# Patient Record
Sex: FEMALE | Race: WHITE | Employment: FULL TIME | RURAL
[De-identification: names, ages, dates, MRNs, and addresses within clinical notes are randomized per-mention and may not be internally consistent; named-entity substitution may affect disease eponyms.]

---

## 2017-03-15 ENCOUNTER — OFFICE VISIT (OUTPATIENT)
Dept: FAMILY MEDICINE CLINIC | Age: 52
End: 2017-03-15
Payer: COMMERCIAL

## 2017-03-15 VITALS
BODY MASS INDEX: 29.63 KG/M2 | HEART RATE: 87 BPM | OXYGEN SATURATION: 96 % | WEIGHT: 161 LBS | TEMPERATURE: 99.5 F | HEIGHT: 62 IN | DIASTOLIC BLOOD PRESSURE: 86 MMHG | SYSTOLIC BLOOD PRESSURE: 136 MMHG

## 2017-03-15 DIAGNOSIS — R50.9 FEVER, UNSPECIFIED FEVER CAUSE: Primary | ICD-10-CM

## 2017-03-15 DIAGNOSIS — I10 ESSENTIAL HYPERTENSION: ICD-10-CM

## 2017-03-15 DIAGNOSIS — E55.9 VITAMIN D DEFICIENCY: ICD-10-CM

## 2017-03-15 DIAGNOSIS — E11.9 TYPE 2 DIABETES MELLITUS WITHOUT COMPLICATION, WITHOUT LONG-TERM CURRENT USE OF INSULIN (HCC): ICD-10-CM

## 2017-03-15 DIAGNOSIS — E78.00 ELEVATED CHOLESTEROL: ICD-10-CM

## 2017-03-15 DIAGNOSIS — M54.2 NECK PAIN: ICD-10-CM

## 2017-03-15 DIAGNOSIS — F51.01 PRIMARY INSOMNIA: ICD-10-CM

## 2017-03-15 LAB
ALBUMIN SERPL-MCNC: 4.6 G/DL (ref 3.5–5.2)
ALP BLD-CCNC: 64 U/L (ref 35–104)
ALT SERPL-CCNC: 23 U/L (ref 5–33)
ANION GAP SERPL CALCULATED.3IONS-SCNC: 13 MMOL/L (ref 7–19)
AST SERPL-CCNC: 33 U/L (ref 5–32)
BILIRUB SERPL-MCNC: <0.2 MG/DL (ref 0.2–1.2)
BUN BLDV-MCNC: 11 MG/DL (ref 6–20)
CALCIUM SERPL-MCNC: 9.3 MG/DL (ref 8.6–10)
CHLORIDE BLD-SCNC: 102 MMOL/L (ref 98–111)
CHOLESTEROL, TOTAL: 163 MG/DL (ref 160–199)
CO2: 26 MMOL/L (ref 22–29)
CREAT SERPL-MCNC: 0.7 MG/DL (ref 0.5–0.9)
GFR NON-AFRICAN AMERICAN: >60
GLOBULIN: 2.9 G/DL
GLUCOSE BLD-MCNC: 98 MG/DL (ref 74–109)
HBA1C MFR BLD: 5.6 %
HCT VFR BLD CALC: 46.2 % (ref 37–47)
HDLC SERPL-MCNC: 46 MG/DL (ref 65–121)
HEMOGLOBIN: 14.4 G/DL (ref 12–16)
LDL CHOLESTEROL CALCULATED: 101 MG/DL
MCH RBC QN AUTO: 30.2 PG (ref 27–31)
MCHC RBC AUTO-ENTMCNC: 31.2 G/DL (ref 33–37)
MCV RBC AUTO: 96.9 FL (ref 81–99)
PDW BLD-RTO: 14.6 % (ref 11.5–14.5)
PLATELET # BLD: 179 K/UL (ref 130–400)
PMV BLD AUTO: 11.1 FL (ref 7.4–10.4)
POTASSIUM SERPL-SCNC: 5.2 MMOL/L (ref 3.5–5)
RBC # BLD: 4.77 M/UL (ref 4.2–5.4)
S PYO AG THROAT QL: NORMAL
SODIUM BLD-SCNC: 141 MMOL/L (ref 136–145)
TOTAL PROTEIN: 7.5 G/DL (ref 6.6–8.7)
TRIGL SERPL-MCNC: 80 MG/DL (ref 150–199)
VITAMIN D 25-HYDROXY: 30.7 NG/ML
WBC # BLD: 2.9 K/UL (ref 4.8–10.8)

## 2017-03-15 PROCEDURE — 87880 STREP A ASSAY W/OPTIC: CPT | Performed by: NURSE PRACTITIONER

## 2017-03-15 PROCEDURE — 36415 COLL VENOUS BLD VENIPUNCTURE: CPT | Performed by: NURSE PRACTITIONER

## 2017-03-15 PROCEDURE — 99214 OFFICE O/P EST MOD 30 MIN: CPT | Performed by: NURSE PRACTITIONER

## 2017-03-15 RX ORDER — AMOXICILLIN AND CLAVULANATE POTASSIUM 875; 125 MG/1; MG/1
1 TABLET, FILM COATED ORAL 2 TIMES DAILY
Qty: 20 TABLET | Refills: 0 | Status: SHIPPED | OUTPATIENT
Start: 2017-03-15 | End: 2017-03-21

## 2017-03-15 RX ORDER — BUTALBITAL, ACETAMINOPHEN AND CAFFEINE 50; 325; 40 MG/1; MG/1; MG/1
1 TABLET ORAL EVERY 4 HOURS PRN
Qty: 60 TABLET | Refills: 2 | Status: SHIPPED | OUTPATIENT
Start: 2017-03-15 | End: 2020-06-04 | Stop reason: ALTCHOICE

## 2017-03-15 RX ORDER — METHYLPREDNISOLONE 4 MG/1
TABLET ORAL
Qty: 1 KIT | Refills: 0 | Status: SHIPPED | OUTPATIENT
Start: 2017-03-15 | End: 2017-03-21

## 2017-03-15 RX ORDER — BUTALBITAL, ACETAMINOPHEN AND CAFFEINE 50; 325; 40 MG/1; MG/1; MG/1
1 TABLET ORAL EVERY 4 HOURS PRN
Qty: 60 TABLET | Refills: 0 | Status: CANCELLED | OUTPATIENT
Start: 2017-03-15

## 2017-03-15 ASSESSMENT — ENCOUNTER SYMPTOMS
ORTHOPNEA: 0
SPUTUM PRODUCTION: 1
WHEEZING: 0
COUGH: 1
RHINORRHEA: 1
GASTROINTESTINAL NEGATIVE: 1
BACK PAIN: 0
SORE THROAT: 1
HEMOPTYSIS: 0
EYES NEGATIVE: 1

## 2017-03-21 ENCOUNTER — OFFICE VISIT (OUTPATIENT)
Dept: FAMILY MEDICINE CLINIC | Age: 52
End: 2017-03-21
Payer: COMMERCIAL

## 2017-03-21 VITALS
BODY MASS INDEX: 29.63 KG/M2 | DIASTOLIC BLOOD PRESSURE: 88 MMHG | WEIGHT: 161 LBS | SYSTOLIC BLOOD PRESSURE: 130 MMHG | HEART RATE: 70 BPM | HEIGHT: 62 IN | OXYGEN SATURATION: 96 % | TEMPERATURE: 98.6 F

## 2017-03-21 DIAGNOSIS — R06.02 SOB (SHORTNESS OF BREATH): ICD-10-CM

## 2017-03-21 DIAGNOSIS — R05.9 COUGH: Primary | ICD-10-CM

## 2017-03-21 DIAGNOSIS — J40 BRONCHITIS: ICD-10-CM

## 2017-03-21 PROCEDURE — 71020 XR CHEST STANDARD TWO VW: CPT | Performed by: NURSE PRACTITIONER

## 2017-03-21 PROCEDURE — 99214 OFFICE O/P EST MOD 30 MIN: CPT | Performed by: NURSE PRACTITIONER

## 2017-03-21 RX ORDER — IPRATROPIUM BROMIDE AND ALBUTEROL SULFATE 2.5; .5 MG/3ML; MG/3ML
SOLUTION RESPIRATORY (INHALATION)
Qty: 360 ML | Refills: 5 | Status: SHIPPED | OUTPATIENT
Start: 2017-03-21

## 2017-03-21 RX ORDER — GUAIFENESIN AND CODEINE PHOSPHATE 100; 10 MG/5ML; MG/5ML
10 SOLUTION ORAL EVERY 4 HOURS PRN
Qty: 240 ML | Refills: 0 | Status: SHIPPED | OUTPATIENT
Start: 2017-03-21 | End: 2018-03-21

## 2017-03-21 RX ORDER — LEVOFLOXACIN 500 MG/1
500 TABLET, FILM COATED ORAL DAILY
Qty: 10 TABLET | Refills: 0 | Status: SHIPPED | OUTPATIENT
Start: 2017-03-21 | End: 2017-03-31

## 2017-03-28 ENCOUNTER — OFFICE VISIT (OUTPATIENT)
Dept: FAMILY MEDICINE CLINIC | Age: 52
End: 2017-03-28
Payer: COMMERCIAL

## 2017-03-28 VITALS
BODY MASS INDEX: 29.44 KG/M2 | WEIGHT: 160 LBS | HEIGHT: 62 IN | SYSTOLIC BLOOD PRESSURE: 132 MMHG | DIASTOLIC BLOOD PRESSURE: 86 MMHG
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
## 2017-03-28 DIAGNOSIS — M54.2 NECK PAIN: ICD-10-CM

## 2017-03-28 DIAGNOSIS — M79.642 BILATERAL HAND PAIN: ICD-10-CM

## 2017-03-28 DIAGNOSIS — M79.641 BILATERAL HAND PAIN: ICD-10-CM

## 2017-03-28 DIAGNOSIS — M54.9 OTHER ACUTE BACK PAIN: ICD-10-CM

## 2017-03-28 DIAGNOSIS — M79.602 PAIN OF LEFT UPPER EXTREMITY: ICD-10-CM

## 2017-03-28 PROCEDURE — 72110 X-RAY EXAM L-2 SPINE 4/>VWS: CPT | Performed by: NURSE PRACTITIONER

## 2017-03-28 PROCEDURE — 73080 X-RAY EXAM OF ELBOW: CPT | Performed by: NURSE PRACTITIONER

## 2017-03-28 PROCEDURE — 72050 X-RAY EXAM NECK SPINE 4/5VWS: CPT | Performed by: NURSE PRACTITIONER

## 2017-03-28 PROCEDURE — 73130 X-RAY EXAM OF HAND: CPT | Performed by: NURSE PRACTITIONER

## 2017-03-28 PROCEDURE — 99214 OFFICE O/P EST MOD 30 MIN: CPT | Performed by: NURSE PRACTITIONER

## 2017-03-28 PROCEDURE — 73502 X-RAY EXAM HIP UNI 2-3 VIEWS: CPT | Performed by: NURSE PRACTITIONER

## 2017-03-28 ASSESSMENT — ENCOUNTER SYMPTOMS
NAUSEA: 0
ORTHOPNEA: 0
EYES NEGATIVE: 1
SPUTUM PRODUCTION: 0
COUGH: 0
HEMOPTYSIS: 0
RESPIRATORY NEGATIVE: 1
BACK PAIN: 1
GASTROINTESTINAL NEGATIVE: 1

## 2017-03-29 ENCOUNTER — HOSPITAL ENCOUNTER (OUTPATIENT)
Dept: GENERAL RADIOLOGY | Age: 52
Discharge: HOME | End: 2017-03-29
Payer: COMMERCIAL
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
## 2017-03-29 DIAGNOSIS — M79.642 BILATERAL HAND PAIN: ICD-10-CM

## 2017-03-29 DIAGNOSIS — M79.602 PAIN OF LEFT UPPER EXTREMITY: ICD-10-CM

## 2017-03-29 DIAGNOSIS — M54.9 OTHER ACUTE BACK PAIN: ICD-10-CM

## 2017-03-29 DIAGNOSIS — M54.2 NECK PAIN: ICD-10-CM

## 2017-03-29 DIAGNOSIS — M79.641 BILATERAL HAND PAIN: ICD-10-CM

## 2017-04-05 ENCOUNTER — HOSPITAL ENCOUNTER (OUTPATIENT)
Dept: GENERAL RADIOLOGY | Age: 52
Discharge: HOME | End: 2017-04-05
Payer: COMMERCIAL

## 2017-04-05 DIAGNOSIS — R06.02 SOB (SHORTNESS OF BREATH): ICD-10-CM

## 2017-04-05 DIAGNOSIS — R05.9 COUGH: ICD-10-CM

## 2017-04-11 ENCOUNTER — TELEPHONE (OUTPATIENT)
Dept: FAMILY MEDICINE CLINIC | Age: 52
End: 2017-04-11

## 2017-04-11 RX ORDER — LEVOFLOXACIN 500 MG/1
500 TABLET, FILM COATED ORAL DAILY
Qty: 10 TABLET | Refills: 0 | Status: SHIPPED | OUTPATIENT
Start: 2017-04-11 | End: 2017-04-21

## 2017-06-08 ENCOUNTER — NURSE ONLY (OUTPATIENT)
Dept: FAMILY MEDICINE CLINIC | Age: 52
End: 2017-06-08
Payer: COMMERCIAL

## 2017-06-08 DIAGNOSIS — G63 NEUROPATHY DUE TO SJOGREN'S SYNDROME (HCC): Primary | ICD-10-CM

## 2017-06-08 DIAGNOSIS — M35.06 NEUROPATHY DUE TO SJOGREN'S SYNDROME (HCC): Primary | ICD-10-CM

## 2017-06-08 LAB
BASOPHILS ABSOLUTE: 0.1 K/UL (ref 0–0.2)
BASOPHILS RELATIVE PERCENT: 0.7 % (ref 0–1)
EOSINOPHILS ABSOLUTE: 0.2 K/UL (ref 0–0.6)
EOSINOPHILS RELATIVE PERCENT: 3.1 % (ref 0–5)
HCT VFR BLD CALC: 43.9 % (ref 37–47)
HEMOGLOBIN: 13.6 G/DL (ref 12–16)
LYMPHOCYTES ABSOLUTE: 2.5 K/UL (ref 1.1–4.5)
LYMPHOCYTES RELATIVE PERCENT: 35.4 % (ref 20–40)
MCH RBC QN AUTO: 30.3 PG (ref 27–31)
MCHC RBC AUTO-ENTMCNC: 31 G/DL (ref 33–37)
MCV RBC AUTO: 97.8 FL (ref 81–99)
MONOCYTES ABSOLUTE: 0.5 K/UL (ref 0–0.9)
MONOCYTES RELATIVE PERCENT: 7.7 % (ref 0–10)
NEUTROPHILS ABSOLUTE: 3.7 K/UL (ref 1.5–7.5)
NEUTROPHILS RELATIVE PERCENT: 52.8 % (ref 50–65)
PDW BLD-RTO: 13.5 % (ref 11.5–14.5)
PLATELET # BLD: 216 K/UL (ref 130–400)
PMV BLD AUTO: 10.9 FL (ref 9.4–12.3)
RBC # BLD: 4.49 M/UL (ref 4.2–5.4)
WBC # BLD: 7 K/UL (ref 4.8–10.8)

## 2017-06-08 PROCEDURE — 36415 COLL VENOUS BLD VENIPUNCTURE: CPT | Performed by: NURSE PRACTITIONER

## 2017-06-19 ENCOUNTER — TELEPHONE (OUTPATIENT)
Dept: FAMILY MEDICINE CLINIC | Age: 52
End: 2017-06-19

## 2017-06-19 RX ORDER — METHYLPREDNISOLONE 4 MG/1
TABLET ORAL
Qty: 1 KIT | Refills: 0 | Status: SHIPPED | OUTPATIENT
Start: 2017-06-19 | End: 2017-06-25

## 2017-06-19 RX ORDER — AMOXICILLIN AND CLAVULANATE POTASSIUM 875; 125 MG/1; MG/1
1 TABLET, FILM COATED ORAL 2 TIMES DAILY
Qty: 20 TABLET | Refills: 0 | Status: SHIPPED | OUTPATIENT
Start: 2017-06-19 | End: 2017-06-29

## 2017-07-24 ENCOUNTER — OFFICE VISIT (OUTPATIENT)
Dept: FAMILY MEDICINE CLINIC | Age: 52
End: 2017-07-24
Payer: COMMERCIAL

## 2017-07-24 VITALS
BODY MASS INDEX: 29.08 KG/M2 | DIASTOLIC BLOOD PRESSURE: 80 MMHG | HEIGHT: 62 IN | SYSTOLIC BLOOD PRESSURE: 112 MMHG | WEIGHT: 158 LBS

## 2017-07-24 DIAGNOSIS — R10.30 LOWER ABDOMINAL PAIN: ICD-10-CM

## 2017-07-24 DIAGNOSIS — K59.09 CHRONIC CONSTIPATION: ICD-10-CM

## 2017-07-24 DIAGNOSIS — R35.0 FREQUENCY OF URINATION: Primary | ICD-10-CM

## 2017-07-24 DIAGNOSIS — Z72.0 TOBACCO ABUSE: ICD-10-CM

## 2017-07-24 LAB
BILIRUBIN, POC: NORMAL
BLOOD URINE, POC: NORMAL
CLARITY, POC: CLEAR
COLOR, POC: YELLOW
GLUCOSE URINE, POC: NORMAL
KETONES, POC: NORMAL
LEUKOCYTE EST, POC: NORMAL
NITRITE, POC: NORMAL
PH, POC: 6
PROTEIN, POC: POSITIVE
SPECIFIC GRAVITY, POC: 1.03
UROBILINOGEN, POC: NORMAL

## 2017-07-24 PROCEDURE — 81002 URINALYSIS NONAUTO W/O SCOPE: CPT | Performed by: NURSE PRACTITIONER

## 2017-07-24 PROCEDURE — 99213 OFFICE O/P EST LOW 20 MIN: CPT | Performed by: NURSE PRACTITIONER

## 2017-07-24 RX ORDER — NICOTINE 21 MG/24HR
1 PATCH, TRANSDERMAL 24 HOURS TRANSDERMAL EVERY 24 HOURS
Qty: 30 PATCH | Refills: 3 | Status: SHIPPED | OUTPATIENT
Start: 2017-07-24 | End: 2020-06-04 | Stop reason: ALTCHOICE

## 2017-07-24 RX ORDER — PHENAZOPYRIDINE HYDROCHLORIDE 100 MG/1
100 TABLET, FILM COATED ORAL 3 TIMES DAILY PRN
Qty: 10 TABLET | Refills: 1 | Status: SHIPPED | OUTPATIENT
Start: 2017-07-24 | End: 2020-06-04

## 2017-07-24 RX ORDER — POLYETHYLENE GLYCOL 3350 17 G/17G
POWDER, FOR SOLUTION ORAL
Qty: 850 G | Refills: 5 | Status: SHIPPED | OUTPATIENT
Start: 2017-07-24 | End: 2020-06-04

## 2017-07-24 ASSESSMENT — PATIENT HEALTH QUESTIONNAIRE - PHQ9
4. FEELING TIRED OR HAVING LITTLE ENERGY: 3
5. POOR APPETITE OR OVEREATING: 0
8. MOVING OR SPEAKING SO SLOWLY THAT OTHER PEOPLE COULD HAVE NOTICED. OR THE OPPOSITE, BEING SO FIGETY OR RESTLESS THAT YOU HAVE BEEN MOVING AROUND A LOT MORE THAN USUAL: 3
10. IF YOU CHECKED OFF ANY PROBLEMS, HOW DIFFICULT HAVE THESE PROBLEMS MADE IT FOR YOU TO DO YOUR WORK, TAKE CARE OF THINGS AT HOME, OR GET ALONG WITH OTHER PEOPLE: 2
3. TROUBLE FALLING OR STAYING ASLEEP: 3
6. FEELING BAD ABOUT YOURSELF - OR THAT YOU ARE A FAILURE OR HAVE LET YOURSELF OR YOUR FAMILY DOWN: 0
9. THOUGHTS THAT YOU WOULD BE BETTER OFF DEAD, OR OF HURTING YOURSELF: 0
2. FEELING DOWN, DEPRESSED OR HOPELESS: 3
7. TROUBLE CONCENTRATING ON THINGS, SUCH AS READING THE NEWSPAPER OR WATCHING TELEVISION: 3
SUM OF ALL RESPONSES TO PHQ QUESTIONS 1-9: 18
1. LITTLE INTEREST OR PLEASURE IN DOING THINGS: 3
SUM OF ALL RESPONSES TO PHQ9 QUESTIONS 1 & 2: 6

## 2017-07-24 ASSESSMENT — ENCOUNTER SYMPTOMS
CONSTIPATION: 1
RESPIRATORY NEGATIVE: 1
EYES NEGATIVE: 1
HEMOPTYSIS: 0
BACK PAIN: 0
SPUTUM PRODUCTION: 0
ABDOMINAL PAIN: 1
ORTHOPNEA: 0
COUGH: 0
NAUSEA: 0

## 2017-07-26 ENCOUNTER — OFFICE VISIT (OUTPATIENT)
Dept: FAMILY MEDICINE CLINIC | Age: 52
End: 2017-07-26
Payer: COMMERCIAL

## 2017-07-26 VITALS
DIASTOLIC BLOOD PRESSURE: 80 MMHG | BODY MASS INDEX: 29.26 KG/M2 | HEIGHT: 62 IN | SYSTOLIC BLOOD PRESSURE: 130 MMHG | WEIGHT: 159 LBS

## 2017-07-26 DIAGNOSIS — R10.30 LOWER ABDOMINAL PAIN: Primary | ICD-10-CM

## 2017-07-26 DIAGNOSIS — N89.8 VAGINAL ODOR: ICD-10-CM

## 2017-07-26 LAB
BILIRUBIN, POC: NORMAL
BLOOD URINE, POC: NORMAL
CLARITY, POC: CLEAR
COLOR, POC: YELLOW
GLUCOSE URINE, POC: NORMAL
KETONES, POC: NORMAL
LEUKOCYTE EST, POC: NORMAL
NITRITE, POC: NORMAL
PH, POC: 8.5
PROTEIN, POC: NORMAL
SPECIFIC GRAVITY, POC: 1.01
UROBILINOGEN, POC: NORMAL

## 2017-07-26 PROCEDURE — 81002 URINALYSIS NONAUTO W/O SCOPE: CPT | Performed by: NURSE PRACTITIONER

## 2017-07-26 PROCEDURE — 99213 OFFICE O/P EST LOW 20 MIN: CPT | Performed by: NURSE PRACTITIONER

## 2017-07-26 RX ORDER — METRONIDAZOLE 500 MG/1
500 TABLET ORAL 2 TIMES DAILY
Qty: 14 TABLET | Refills: 0 | Status: SHIPPED | OUTPATIENT
Start: 2017-07-26 | End: 2017-08-02

## 2017-07-26 ASSESSMENT — ENCOUNTER SYMPTOMS
BACK PAIN: 0
CONSTIPATION: 1
RESPIRATORY NEGATIVE: 1
COUGH: 0
ORTHOPNEA: 0
ABDOMINAL PAIN: 1
SPUTUM PRODUCTION: 0
EYES NEGATIVE: 1
HEMOPTYSIS: 0

## 2017-08-02 ENCOUNTER — TELEPHONE (OUTPATIENT)
Dept: FAMILY MEDICINE CLINIC | Age: 52
End: 2017-08-02

## 2018-01-25 ENCOUNTER — APPOINTMENT (OUTPATIENT)
Dept: GENERAL RADIOLOGY | Facility: HOSPITAL | Age: 53
End: 2018-01-25

## 2018-01-25 PROCEDURE — 73030 X-RAY EXAM OF SHOULDER: CPT

## 2020-01-21 ENCOUNTER — PROCEDURE VISIT (OUTPATIENT)
Dept: OTOLARYNGOLOGY | Age: 55
End: 2020-01-21
Payer: MEDICAID

## 2020-01-21 ENCOUNTER — OFFICE VISIT (OUTPATIENT)
Dept: OTOLARYNGOLOGY | Age: 55
End: 2020-01-21
Payer: MEDICAID

## 2020-01-21 VITALS
DIASTOLIC BLOOD PRESSURE: 78 MMHG | HEIGHT: 62 IN | SYSTOLIC BLOOD PRESSURE: 124 MMHG | BODY MASS INDEX: 29.81 KG/M2 | WEIGHT: 162 LBS

## 2020-01-21 PROCEDURE — 99203 OFFICE O/P NEW LOW 30 MIN: CPT | Performed by: NURSE PRACTITIONER

## 2020-01-21 PROCEDURE — 92557 COMPREHENSIVE HEARING TEST: CPT | Performed by: AUDIOLOGIST

## 2020-01-21 PROCEDURE — 92550 TYMPANOMETRY & REFLEX THRESH: CPT | Performed by: AUDIOLOGIST

## 2020-01-21 RX ORDER — MECLIZINE HYDROCHLORIDE 25 MG/1
25 TABLET ORAL 2 TIMES DAILY PRN
Qty: 30 TABLET | Refills: 0 | Status: SHIPPED | OUTPATIENT
Start: 2020-01-21 | End: 2020-03-21

## 2020-01-21 RX ORDER — ENALAPRIL MALEATE 10 MG/1
10 TABLET ORAL DAILY
COMMUNITY

## 2020-01-21 ASSESSMENT — ENCOUNTER SYMPTOMS
ALLERGIC/IMMUNOLOGIC NEGATIVE: 1
EYES NEGATIVE: 1
GASTROINTESTINAL NEGATIVE: 1
RESPIRATORY NEGATIVE: 1
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
## 2020-01-29 ENCOUNTER — HOSPITAL ENCOUNTER (OUTPATIENT)
Dept: CT IMAGING | Age: 55
Discharge: HOME | End: 2020-01-29
Payer: MEDICAID

## 2020-01-29 PROCEDURE — 70470 CT HEAD/BRAIN W/O & W/DYE: CPT

## 2020-01-29 PROCEDURE — 6360000004 HC RX CONTRAST MEDICATION: Performed by: NURSE PRACTITIONER

## 2020-01-29 RX ADMIN — IOPAMIDOL 60 ML: 755 INJECTION, SOLUTION INTRAVENOUS at 11:13

## 2020-01-30 ENCOUNTER — TELEPHONE (OUTPATIENT)
Dept: NEUROLOGY | Age: 55
End: 2020-01-30

## 2020-04-28 ENCOUNTER — TELEPHONE (OUTPATIENT)
Dept: NEUROSURGERY | Age: 55
End: 2020-04-28

## 2020-06-01 ENCOUNTER — TELEPHONE (OUTPATIENT)
Dept: NEUROLOGY | Age: 55
End: 2020-06-01

## 2020-06-04 ENCOUNTER — OFFICE VISIT (OUTPATIENT)
Dept: NEUROLOGY | Age: 55
End: 2020-06-04
Payer: MEDICAID

## 2020-06-04 VITALS
DIASTOLIC BLOOD PRESSURE: 86 MMHG | BODY MASS INDEX: 29.81 KG/M2 | SYSTOLIC BLOOD PRESSURE: 160 MMHG | HEIGHT: 62 IN | WEIGHT: 162 LBS | HEART RATE: 68 BPM

## 2020-06-04 DIAGNOSIS — G89.29 CHRONIC INTRACTABLE HEADACHE, UNSPECIFIED HEADACHE TYPE: ICD-10-CM

## 2020-06-04 DIAGNOSIS — R51.9 CHRONIC INTRACTABLE HEADACHE, UNSPECIFIED HEADACHE TYPE: ICD-10-CM

## 2020-06-04 LAB — SEDIMENTATION RATE, ERYTHROCYTE: 8 MM/HR (ref 0–25)

## 2020-06-04 PROCEDURE — 99213 OFFICE O/P EST LOW 20 MIN: CPT | Performed by: PHYSICIAN ASSISTANT

## 2020-06-04 RX ORDER — GABAPENTIN 300 MG/1
1 CAPSULE ORAL 3 TIMES DAILY
COMMUNITY
Start: 2020-05-01

## 2020-06-04 RX ORDER — UBROGEPANT 100 MG/1
TABLET ORAL
Qty: 30 TABLET | Refills: 2 | Status: SHIPPED | OUTPATIENT
Start: 2020-06-04

## 2020-06-04 RX ORDER — NAPROXEN SODIUM 220 MG
220 TABLET ORAL 2 TIMES DAILY WITH MEALS
COMMUNITY
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
## 2020-06-04 NOTE — PATIENT INSTRUCTIONS
Take 1 as a single dose at onset of migraine, may repeat once based on response and tolerability after 2 hours, maximum of 200 mg in 24 hours and for no more than 8 migraines in a 30 day period        Patient education: Headache causes and diagnosis in adults     HEADACHE OVERVIEW -- Headaches can be quite debilitating, although most headaches are not caused by life-threatening disorders. Most headaches are caused by one of four syndromes:  ?Tension-type headache  ? Migraine headache  ? Chronic daily headache  ? Cluster headache   The causes and diagnosis of non-migraine headaches are discussed here. Migraine headaches are discussed separately. A summary of headache treatments is also available. A discussion of headaches in children is available separately. TENSION TYPE HEADACHE  Symptoms -- Symptoms of tension type headaches (TTH) include:  ?Pressure or tightness around both sides of the head or neck  ? Mild to moderate pain that is steady and does not throb  ? Pain is not worsened by activity  ? Pain can increase or decrease in severity over the course of the headache  ? There may be tenderness in the muscles of the head, neck, or shoulders  People with TTH often feel stress or tension before their headache. Unlike migraine, tension headaches occur without other symptoms such as nausea, vomiting, sensitivity to lights and sounds, or an aura. However, some people have symptoms of both tension and migraine headache. MIGRAINE HEADACHES -- Migraine headaches are a type of headache that causes moderate to severe pain that is worsened by light, noise, and motion. Some people also experience nausea and vomiting. Migraine headaches typically last for a few hours, but may last for as long as three days. Migraines are discussed in detail in a separate article. CLUSTER HEADACHE -- Cluster headaches are severe, debilitating headaches that occur repeatedly for weeks to months at a time, followed by periods with no headache. headaches\". Medication-overuse headache -- Medication-overuse headache [facility]) may occur in people who have frequent migraine, cluster, or tension-type headaches, which leads them to overuse pain medications. A vicious cycle occurs whereby frequent headaches cause the person to take medication frequently (often non-prescription medication), which then causes a rebound headache as the medication wears off, causing more medication use, and so on. Limit the use of acute symptomatic migraine medication (Maxalt, Imitrex, Relpax, Fioricet, opioids, and others) to <10x/month and simple/combination analgesics (Ibuprofen, Tylenol, Excedrin Migraine, and others)<5x/month. Overuse of any number of pain medications can increase the risk of developing medication-overuse headaches. To avoid medication-overuse headaches, we recommend the following:  ? If possible, avoid butalbital combinations (Fiorinal®, Fioricet®, Esgic®) and narcotics completely. ?Do not use triptans (Imitrex® and others) or aspirin/acetaminophen/caffeine combinations (Excedrin®) more than nine days per month. ?Do not use NSAIDS (eg, ibuprofen, Advil, Motrin, aspirin) more than 15 days per month. ?Do not take acetaminophen (eg, Tylenol®) more than two times per week. If you have frequent headaches, you may need a preventive medication. OTHER TYPES OF HEADACHE -- There are a number of other causes of headache. Sinus headache -- Recurrent headaches related to sinus infections are uncommon. Many, if not most, people diagnosed with sinus headaches actually have migraine headaches. Sinus-related pain usually lasts for several days (unlike a typical migraine) and does not cause nausea, vomiting, or sensitivity to noise or light (as seen in migraine)   Post-trauma headaches -- Headaches that occur within one to two days after a head injury are relatively common. Most people report a generalized dull, aching, constant discomfort that worsens intermittently. Other common symptoms include vertigo (sensation of spinning), lightheadedness, difficulty concentrating, problems with memory, becoming tired quickly, and irritability. Post-trauma headaches may continue for up to a few months, although anyone with a headache that does not begin to improve within a week or two after a traumatic event should be evaluated. HEADACHE DIAGNOSIS -- Clinicians typically use a person's description of their headache, in combination with an examination, to determine the type of headache. Some people have more than one type of headache. Most people do not need x-rays or imaging tests. A CT scan (or MRI) may be recommended in some circumstances, for example, if symptoms are unusual, if there are any danger signs (see 'Headache danger signs' below), or if there are any abnormalities seen during the examination. Other possible reasons for brain imaging include:  ?Headaches that steadily worsen despite treatment  ? A sudden change in the pattern of headaches  ? Signs or symptoms that suggest that another medical condition may be causing symptoms  HEADACHE DANGER SIGNS -- The vast majority of headaches are not life threatening. You should seek medical attention immediately if your headache:  ?Comes on suddenly, becomes severe within a few seconds or minutes, or that could be described as \"the worst headache of your life\"  ? Is severe and occurs with a fever or stiff neck  ? Occurs with a seizure, personality changes, confusion, or passing out  ? Begins quickly after strenuous exercise or minor injury  ? Is new and occurs with weakness, numbness, or difficulty seeing. While migraine headaches can sometimes cause these symptoms, you should be evaluated urgently the first time these symptoms appear.   If you have persistent or frequent headaches, headaches that interfere with normal activities, or your headaches become more painful, you should see a healthcare provider during normal office effects. COMPLEMENTARY HEADACHE TREATMENT -- Several therapies can be used along with medical treatment in people with headache. Lifestyle changes -- Some simple lifestyle adjustments can help to reduce the frequency of headaches. These include:  ? Stop smoking  ? Reduce the amount of alcohol you drink  ? Decrease or stop drinking/eating caffeine  ? Eat and sleep on a regular schedule  ? Exercise several times per week  While there are no clinical trials proving the benefit of these measures, many headache specialists have found them helpful for their patients. Physical therapy -- Some people with frequent headaches benefit from working with a physical therapist who has a special interest in headaches. This treatment may be used if you do not respond or only partially or temporarily respond to medicines, or if you cannot use medicines (eg, women who are pregnant or breastfeeding). Acupuncture -- Acupuncture involves inserting hair-thin, metal needles into the skin at specific points on the body. It causes little to no pain. Electrical stimulation is sometimes applied to the acupuncture needle. Acupuncture has not been proven to improve tension-type or chronic daily headaches. However, people who do not want to try or who cannot tolerate other treatments may try using acupuncture. Behavioral therapy -- Headaches can be triggered or worsened by stress, anxiety, depression, and other psychological factors. Furthermore, living with headache pain can cause difficulties in relationships, at work or school, and with general day to day living. Behavioral therapy works by helping you to address the stress, anger, or frustration that can come with frequent or chronic headache pain. There are many different types of behavioral therapy:  ? Psychotherapy involves meeting with a psychologist, psychiatrist, or  to discuss emotional responses to living with chronic pain, treatment successes or failures, and/or personal relationships. ?Group psychotherapy allows you to compare your experiences with headaches, overcome the tendency to withdraw and become isolated in your pain, and support others' attempts with pain management. ?Relaxation techniques can relieve muscle tension, and may include meditation, progressive muscle relaxation, self-hypnosis, and biofeedback. Biofeedback may be especially helpful for people with chronic tension-type headaches. ?Group skill-building exercises help you to learn about living with pain, including ways to improve relationships and build strength, ways to avoid negative thinking, and learning to deal with pain flares. A number of self-help books are also available on these topics, including Brynn Bills and Karine's The Pain Survival Guide: How to [address], and Ed's Managing Pain Before It Manages You   Herbal and homeopathic remedies -- A number of homeopathic remedies are promoted to relieve or prevent headaches, including migraines. However, the effect of these remedies is not clear and these remedies are not recommended. WHERE TO GET MORE INFORMATION -- Your healthcare provider is the best source of information for questions and concerns related to your medical problem. This article will be updated as needed on our web site ([email or web address]). Related topics for patients, as well as selected articles written for healthcare professionals, are also available. Some of the most relevant are listed below.

## 2020-06-05 ENCOUNTER — TELEPHONE (OUTPATIENT)
Dept: NEUROLOGY | Age: 55
End: 2020-06-05

## 2020-06-05 PROBLEM — R51.9 CHRONIC INTRACTABLE HEADACHE: Status: ACTIVE | Noted: 2020-06-05

## 2020-06-05 PROBLEM — G89.29 CHRONIC INTRACTABLE HEADACHE: Status: ACTIVE | Noted: 2020-06-05

## 2020-06-08 ENCOUNTER — TELEPHONE (OUTPATIENT)
Dept: NEUROLOGY | Age: 55
End: 2020-06-08

## 2020-06-08 NOTE — TELEPHONE ENCOUNTER
Dorian Cook (Key: [id number])   Rx #: [id number]   Ubrelvy 100MG tablets   Form  [employer] Electronic [place] Form ([address])   Created   2 days ago   Sent to Plan   less than a minute ago   Plan Response   less than a minute ago   Submit Clinical Questions   Determination

## 2020-08-06 ENCOUNTER — TELEPHONE (OUTPATIENT)
Dept: NEUROLOGY | Age: 55
End: 2020-08-06

## 2020-08-06 ENCOUNTER — OFFICE VISIT (OUTPATIENT)
Dept: NEUROLOGY | Age: 55
End: 2020-08-06
Payer: MEDICAID

## 2020-08-06 VITALS
HEART RATE: 69 BPM | OXYGEN SATURATION: 97 % | SYSTOLIC BLOOD PRESSURE: 149 MMHG | BODY MASS INDEX: 29.81 KG/M2 | HEIGHT: 62 IN | DIASTOLIC BLOOD PRESSURE: 88 MMHG | WEIGHT: 162 LBS

## 2020-08-06 PROBLEM — G43.019 INTRACTABLE MIGRAINE WITHOUT AURA AND WITHOUT STATUS MIGRAINOSUS: Status: ACTIVE | Noted: 2020-08-06

## 2020-08-06 PROBLEM — Z84.89 FAMILY HISTORY OF BRAIN TUMOR: Status: ACTIVE | Noted: 2020-08-06

## 2020-08-06 PROCEDURE — 99214 OFFICE O/P EST MOD 30 MIN: CPT | Performed by: PHYSICIAN ASSISTANT

## 2020-08-06 NOTE — PATIENT INSTRUCTIONS
headaches:  ?Begin quickly without any warning and reach a peak within a few minutes. ? The headache is usually deep, excruciating, continuous, and explosive in quality, although occasionally it may be pulsatile and throbbing. ? The attack may occur up to eight times per day but is usually short in duration (between 15 minutes and three hours). ?The pain typically begins in or around the eye or temple; less commonly it starts in the face, neck, ear, or side of the head. ?The pain is always on one side. ? Most people with cluster headache are restless and may pace or rock back and forth when an attack is in progress. ?Cluster headaches are associated with eye redness and tear production on the side of the pain, a stuffy and runny nose, sweating, and pale skin. ? Some people are light sensitive in the eye on the affected side. Cluster headaches can begin at any age. People with cluster headaches are more likely to have family members who also have cluster headaches. Drinking alcohol can bring on a cluster headache. CHRONIC DAILY HEADACHE -- Some people develop very frequent headaches, as frequent as every day in some cases. When a headache is present for more than 15 days per month for at least three months, it is described as a chronic daily headache. Chronic daily headache is not a type of headache but a category that includes frequent headaches of various kinds. Most people with chronic daily headache have migraine or tension-type headache as the underlying type of headache. They often start out having an occasional migraine or tension-type headache, but the headaches became more frequent over months or years. Some people with frequent headache use headache medications too often, which can lead to \"medication-overuse headaches\".   Medication-overuse headache -- Medication-overuse headache [facility]) may occur in people who have frequent migraine, cluster, or tension-type headaches, which leads them to overuse pain medications. A vicious cycle occurs whereby frequent headaches cause the person to take medication frequently (often non-prescription medication), which then causes a rebound headache as the medication wears off, causing more medication use, and so on. Limit the use of acute symptomatic migraine medication (Maxalt, Imitrex, Relpax, Fioricet, opioids, and others) to <10x/month and simple/combination analgesics (Ibuprofen, Tylenol, Excedrin Migraine, and others)<5x/month. Overuse of any number of pain medications can increase the risk of developing medication-overuse headaches. To avoid medication-overuse headaches, we recommend the following:  ? If possible, avoid butalbital combinations (Fiorinal®, Fioricet®, Esgic®) and narcotics completely. ?Do not use triptans (Imitrex® and others) or aspirin/acetaminophen/caffeine combinations (Excedrin®) more than nine days per month. ?Do not use NSAIDS (eg, ibuprofen, Advil, Motrin, aspirin) more than 15 days per month. ?Do not take acetaminophen (eg, Tylenol®) more than two times per week. If you have frequent headaches, you may need a preventive medication. OTHER TYPES OF HEADACHE -- There are a number of other causes of headache. Sinus headache -- Recurrent headaches related to sinus infections are uncommon. Many, if not most, people diagnosed with sinus headaches actually have migraine headaches. Sinus-related pain usually lasts for several days (unlike a typical migraine) and does not cause nausea, vomiting, or sensitivity to noise or light (as seen in migraine)   Post-trauma headaches -- Headaches that occur within one to two days after a head injury are relatively common. Most people report a generalized dull, aching, constant discomfort that worsens intermittently. Other common symptoms include vertigo (sensation of spinning), lightheadedness, difficulty concentrating, problems with memory, becoming tired quickly, and irritability.   Post-trauma headaches may continue for up to a few months, although anyone with a headache that does not begin to improve within a week or two after a traumatic event should be evaluated. HEADACHE DIAGNOSIS -- Clinicians typically use a person's description of their headache, in combination with an examination, to determine the type of headache. Some people have more than one type of headache. Most people do not need x-rays or imaging tests. A CT scan (or MRI) may be recommended in some circumstances, for example, if symptoms are unusual, if there are any danger signs (see 'Headache danger signs' below), or if there are any abnormalities seen during the examination. Other possible reasons for brain imaging include:  ?Headaches that steadily worsen despite treatment  ? A sudden change in the pattern of headaches  ? Signs or symptoms that suggest that another medical condition may be causing symptoms  HEADACHE DANGER SIGNS -- The vast majority of headaches are not life threatening. You should seek medical attention immediately if your headache:  ?Comes on suddenly, becomes severe within a few seconds or minutes, or that could be described as \"the worst headache of your life\"  ? Is severe and occurs with a fever or stiff neck  ? Occurs with a seizure, personality changes, confusion, or passing out  ? Begins quickly after strenuous exercise or minor injury  ? Is new and occurs with weakness, numbness, or difficulty seeing. While migraine headaches can sometimes cause these symptoms, you should be evaluated urgently the first time these symptoms appear. If you have persistent or frequent headaches, headaches that interfere with normal activities, or your headaches become more painful, you should see a healthcare provider during normal office hours. Headaches and brain tumor -- Headaches occur in approximately 50 percent of people who have brain tumors.  However, headaches are very common and brain tumors are rarely found in people who are evaluated for headaches. Many people with brain tumors have chronic headaches that are worse with bending over or occur with nausea and vomiting, although these symptoms can also occur with headaches not related to a brain tumor. HEADACHE TREATMENT -- The treatment of headaches is discussed separately. WHERE TO GET MORE INFORMATION -- Your healthcare provider is the best source of information for questions and concerns related to your medical problem. Patient education: Headache treatment in adults (Beyond the Basics)   Authors:  MD Sun Church, PhD  Section :  Anabel Hagen MD, [facility]  [place] :  Rd Wilkes MD, PhD  Contributor Disclosures  All topics are updated as new evidence becomes available and our peer review process is complete. Literature review current through: Jul 2018. This topic last updated: Jul 21, 2017. HEADACHE OVERVIEW -- In many people, headaches can be well controlled with a combination of medicines and complementary therapies. Treatment is most successful when it is tailored to your needs. The treatment of tension, chronic daily, medication overuse, and cluster headaches will be reviewed here. Migraine headaches are discussed separately. The causes and diagnosis of headache are discussed separately. HEADACHE TREATMENT TYPES -- Headache treatment depends upon the frequency, severity, and symptoms of your headache. ? Acute treatment refers to medicines you can take when you have a headache to relieve the pain immediately. ?Preventive treatment refers to medicines you can take on a regular (usually daily) basis to prevent headaches in the future. TENSION TYPE HEADACHE TREATMENT  Acute treatment -- Tension type headaches that occur less than 15 times per month can usually be treated with a pain reliever. Pain reliever -- A pain reliever may be recommended first for the treatment of tension type headache.  These drugs include:  ? Aspirin  ? Acetaminophen (eg, Tylenol®)  ? Nonsteroidal antiinflammatory drugs (NSAIDs) such as ibuprofen (eg, Motrin or Advil), indomethacin, or naproxen (eg, Naprosyn or Aleve). Mild pain relievers are also available in combination with caffeine, which enhances the drug's effect. As an example, Excedrin® contains a combination of acetaminophen-aspirin-caffeine. This combination may be recommended if a pain reliever alone does not relieve the headache. Pain relievers should not be used too often because overuse can lead to medication-overuse headaches or chronic daily headaches. If you respond to a pain reliever, you should continue taking these with each headache. However:  ? Do not use pain relievers more than nine days per month on average, or more than two doses per episode. ?If a pain reliever does not control your headache, talk to your healthcare provider for other suggestions. People with gastritis (inflammation of the stomach), ulcers, kidney disease, and bleeding conditions should not take products containing aspirin or NSAIDs. Combination medicines containing butalbital and opioids -- Combinations of an opioid (narcotic) and a pain reliever are available, but are generally not recommended since they are habit-forming and can increase the risk of medication-overuse headaches and chronic daily headaches. Even so, such medications may be considered in special situations where simple pain relievers are ineffective or contraindicated (eg, women in the third trimester of pregnancy, patients with ulcers, severe kidney failure, or liver failure). When healthcare providers encounter patients who are using opioids and barbiturates inappropriately, a \"stop or brake\" policy may be put into place. The use of these medications is then either stopped, or the frequency of use is braked by limiting them to no more than two days per week and 18 tablets per month.   Preventive treatment -- Preventive therapy is recommended for people with tension-type headaches that are frequent, long lasting, or account for a significant amount of total disability. Antidepressant medicines called tricyclics (TCAs) are often used to help prevent frequent tension headaches. Examples of TCAs include amitriptyline (used most commonly), nortriptyline, and protriptyline. The dose of TCAs used for people with headaches is typically much lower than that used for treating depression. It is believed that these drugs reduce pain perception when used in low doses, although it is not exactly clear how the medicines work. It is common to feel tired when you start taking TCAs; this is not always an undesirable side effect since it can help improve sleep if you take TCAs in the evening. TCAs are generally started in low doses and increased gradually. Their full effect may not be seen for weeks to months. Tricyclic antidepressants are sometimes used in combination with behavioral therapy to prevent tension-type headaches. The goal of behavioral therapy is to identify and try to avoid behaviors that can trigger a headache. CHRONIC DAILY HEADACHE TREATMENT -- The management of chronic daily headache depends on the type of background headache (eg, chronic migraine or chronic tension-type headache) and the presence or absence of medication overuse. ? The treatment of chronic migraine should focus on preventive therapy while avoiding migraine triggers and limiting the use of acute headache medications to avoid medication overuse headache. Preventive treatments include medicines, behavioral therapy, or physical therapy. Management often requires the simultaneous use of these different treatments. ?For chronic tension-type headache, effective treatment involves the use of daily preventive medications (eg, tricyclic antidepressants), behavioral therapies and physical therapy.  Like chronic migraine, the combined use of these interventions is often best. ?For medication overuse headache, basic steps are patient education, withdrawal of the overused medication, bridge therapy aimed at headache relief during medication withdrawal, establishment of a headache treatment program appropriate for the underlying type of headache, and relapse prevention. CLUSTER HEADACHE TREATMENT -- Most people who suffer with cluster headaches will need both acute and preventive medicines. Acute therapy -- Acute therapy often includes one or more of the following:  ?Inhaling 100 percent oxygen through a face mask for 20 minutes. Oxygen treatment is often recommended first because it has few side effects. ?Triptans are medicines often used to treat migraines. Triptans (especially injections of sumatriptan) can stop a cluster headache, often within 20 to 30 minutes. If you are unable to give yourself an injection, options include inhaled (nasal spray) sumatriptan or zolmitriptan. If neither oxygen nor triptans are helpful, alternative choices include intranasal lidocaine (liquid applied inside the nose) and ergotamine (a tablet dissolved under the tongue). Preventive treatment -- Preventive therapy is usually started as soon as possible and taken every day when a new cluster of headaches develops. Some people require a combination of medicines. Preventive medicines may be gradually stopped after the cluster has passed, but can be restarted if symptoms recur. The best studied medicines include:  ?Verapamil, a calcium channel blocker, is a pill that is effective and has few side effects. The dose may be slowly increased as needed. ? The glucocorticoid drug prednisone (a pill) is an effective preventive therapy. However, long-term use of glucocorticoids is not recommended due to the risk of side effects. COMPLEMENTARY HEADACHE TREATMENT -- Several therapies can be used along with medical treatment in people with headache.   Lifestyle changes -- Some simple lifestyle adjustments can help to reduce the frequency of headaches. These include:  ? Stop smoking  ? Reduce the amount of alcohol you drink  ? Decrease or stop drinking/eating caffeine  ? Eat and sleep on a regular schedule  ? Exercise several times per week  While there are no clinical trials proving the benefit of these measures, many headache specialists have found them helpful for their patients. Physical therapy -- Some people with frequent headaches benefit from working with a physical therapist who has a special interest in headaches. This treatment may be used if you do not respond or only partially or temporarily respond to medicines, or if you cannot use medicines (eg, women who are pregnant or breastfeeding). Acupuncture -- Acupuncture involves inserting hair-thin, metal needles into the skin at specific points on the body. It causes little to no pain. Electrical stimulation is sometimes applied to the acupuncture needle. Acupuncture has not been proven to improve tension-type or chronic daily headaches. However, people who do not want to try or who cannot tolerate other treatments may try using acupuncture. Behavioral therapy -- Headaches can be triggered or worsened by stress, anxiety, depression, and other psychological factors. Furthermore, living with headache pain can cause difficulties in relationships, at work or school, and with general day to day living. Behavioral therapy works by helping you to address the stress, anger, or frustration that can come with frequent or chronic headache pain. There are many different types of behavioral therapy:  ? Psychotherapy involves meeting with a psychologist, psychiatrist, or  to discuss emotional responses to living with chronic pain, treatment successes or failures, and/or personal relationships.   ?Group psychotherapy allows you to compare your experiences with headaches, overcome the tendency to withdraw and become isolated in your pain, and support others' attempts with pain management. ?Relaxation techniques can relieve muscle tension, and may include meditation, progressive muscle relaxation, self-hypnosis, and biofeedback. Biofeedback may be especially helpful for people with chronic tension-type headaches. ?Group skill-building exercises help you to learn about living with pain, including ways to improve relationships and build strength, ways to avoid negative thinking, and learning to deal with pain flares. A number of self-help books are also available on these topics, including Dustin Rivera and Karine's The Pain Survival Guide: How to [address], and Ed's Managing Pain Before It Manages You   Herbal and homeopathic remedies -- A number of homeopathic remedies are promoted to relieve or prevent headaches, including migraines. However, the effect of these remedies is not clear and these remedies are not recommended. WHERE TO GET MORE INFORMATION -- Your healthcare provider is the best source of information for questions and concerns related to your medical problem. This article will be updated as needed on our web site ([email or web address]). Related topics for patients, as well as selected articles written for healthcare professionals, are also available. Some of the most relevant are listed below.
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
## 2020-08-06 NOTE — PROGRESS NOTES
Tali Scanlon REVIEW OF SYSTEMS    Constitutional: []Fever []Sweat []Chills [] Recent Injury [x] Denies all unless marked  HEENT:[x]Headache  [] Head Injury/Hearing Loss  [] Sore Throat  [] Ear Ache/Dizziness  [x] Denies all unless marked  Spine:  [] Neck pain  [] Back pain  [] Sciaticia  [x] Denies all unless marked  Cardiovascular:[]Heart Disease []Chest Pain [] Palpitations  [x] Denies all unless marked  Pulmonary: []Shortness of Breath []Cough   [x] Denies all unless marke  Gastrointestinal: []Nausea  []Vomiting  []Abdominal Pain  []Constipation  []Diarrhea  []Dark Bloody Stools  [x] Denies all unless marked  Psychiatric/Behavioral:[] Depression [] Anxiety [x] Denies all unless marked  Genitourinary:   [] Frequency  [] Urgency  [] Incontinence [] Pain with Urination  [x] Denies all unless marked  Extremities: [x]Pain  []Swelling  [x] Denies all unless marked  Musculoskeletal: [] Muscle Pain  [] Joint Pain  [] Arthritis [x] Muscle Cramps [] Muscle Twitches  [x] Denies all unless marked  Sleep: [] Insomnia [] Snoring [] Restless Legs [] Sleep Apnea  [] Daytime Sleepiness  [x] Denies all unless marked  Skin:[] Rash [] Skin Discoloration [x] Denies all unless marked   Neurological: [x]Visual Disturbance/Memory Loss [] Loss of Balance [] Slurred Speech/Weakness [] Seizures  [] Vertigo/Dizziness [x] Denies all unless marked

## 2020-08-06 NOTE — TELEPHONE ENCOUNTER
Called spoke with patient , about her MRI appointment, he is aware of the appointment , and location .

## 2020-09-23 ENCOUNTER — HOSPITAL ENCOUNTER (OUTPATIENT)
Dept: MRI IMAGING | Age: 55
Discharge: HOME | End: 2020-09-23
Payer: MEDICAID

## 2020-09-23 PROCEDURE — 70551 MRI BRAIN STEM W/O DYE: CPT

## 2020-10-01 ENCOUNTER — TELEPHONE (OUTPATIENT)
Dept: NEUROLOGY | Age: 55
End: 2020-10-01

## 2020-10-01 NOTE — TELEPHONE ENCOUNTER
I reviewed the MRI and sent my comment on 9/23/2020. I guess they could not get in touch with her. It was negative.

## 2020-10-16 ENCOUNTER — APPOINTMENT (OUTPATIENT)
Dept: CT IMAGING | Age: 55
End: 2020-10-16
Payer: MEDICAID

## 2020-10-16 ENCOUNTER — HOSPITAL ENCOUNTER (EMERGENCY)
Age: 55
Discharge: HOME | End: 2020-10-16
Attending: EMERGENCY MEDICINE
Payer: MEDICAID

## 2020-10-16 VITALS
OXYGEN SATURATION: 98 % | DIASTOLIC BLOOD PRESSURE: 76 MMHG | TEMPERATURE: 98.7 F | WEIGHT: 167 LBS | RESPIRATION RATE: 20 BRPM | BODY MASS INDEX: 30.54 KG/M2 | SYSTOLIC BLOOD PRESSURE: 156 MMHG | HEART RATE: 68 BPM

## 2020-10-16 LAB
ALBUMIN SERPL-MCNC: 4.1 G/DL (ref 3.5–5.2)
ALP BLD-CCNC: 81 U/L (ref 35–104)
ALT SERPL-CCNC: 13 U/L (ref 5–33)
ANION GAP SERPL CALCULATED.3IONS-SCNC: 8 MMOL/L (ref 7–19)
AST SERPL-CCNC: 15 U/L (ref 5–32)
BILIRUB SERPL-MCNC: 0.3 MG/DL (ref 0.2–1.2)
BILIRUBIN URINE: NEGATIVE
BLOOD, URINE: NEGATIVE
BUN BLDV-MCNC: 11 MG/DL (ref 6–20)
CALCIUM SERPL-MCNC: 9.6 MG/DL (ref 8.6–10)
CHLORIDE BLD-SCNC: 104 MMOL/L (ref 98–111)
CLARITY: CLEAR
CO2: 27 MMOL/L (ref 22–29)
COLOR: YELLOW
CREAT SERPL-MCNC: 0.7 MG/DL (ref 0.5–0.9)
GFR AFRICAN AMERICAN: >59
GFR NON-AFRICAN AMERICAN: >60
GLUCOSE BLD-MCNC: 102 MG/DL (ref 74–109)
GLUCOSE URINE: NEGATIVE MG/DL
HCT VFR BLD CALC: 45.8 % (ref 37–47)
HEMOGLOBIN: 14.6 G/DL (ref 12–16)
KETONES, URINE: NEGATIVE MG/DL
LEUKOCYTE ESTERASE, URINE: NEGATIVE
LIPASE: 19 U/L (ref 13–60)
MCH RBC QN AUTO: 29.5 PG (ref 27–31)
MCHC RBC AUTO-ENTMCNC: 31.9 G/DL (ref 33–37)
MCV RBC AUTO: 92.5 FL (ref 81–99)
NITRITE, URINE: NEGATIVE
PDW BLD-RTO: 13.4 % (ref 11.5–14.5)
PH UA: 6 (ref 5–8)
PLATELET # BLD: 206 K/UL (ref 130–400)
PMV BLD AUTO: 10.9 FL (ref 9.4–12.3)
POTASSIUM REFLEX MAGNESIUM: 3.9 MMOL/L (ref 3.5–5)
PROTEIN UA: NEGATIVE MG/DL
RBC # BLD: 4.95 M/UL (ref 4.2–5.4)
SODIUM BLD-SCNC: 139 MMOL/L (ref 136–145)
SPECIFIC GRAVITY UA: 1 (ref 1–1.03)
TOTAL PROTEIN: 7.3 G/DL (ref 6.6–8.7)
TROPONIN: <0.01 NG/ML (ref 0–0.03)
UROBILINOGEN, URINE: 0.2 E.U./DL
WBC # BLD: 8 K/UL (ref 4.8–10.8)

## 2020-10-16 PROCEDURE — 6360000004 HC RX CONTRAST MEDICATION: Performed by: EMERGENCY MEDICINE

## 2020-10-16 PROCEDURE — 96374 THER/PROPH/DIAG INJ IV PUSH: CPT

## 2020-10-16 PROCEDURE — 84484 ASSAY OF TROPONIN QUANT: CPT

## 2020-10-16 PROCEDURE — 99999 PR OFFICE/OUTPT VISIT,PROCEDURE ONLY: CPT | Performed by: EMERGENCY MEDICINE

## 2020-10-16 PROCEDURE — 36415 COLL VENOUS BLD VENIPUNCTURE: CPT

## 2020-10-16 PROCEDURE — 6360000002 HC RX W HCPCS: Performed by: EMERGENCY MEDICINE

## 2020-10-16 PROCEDURE — 74177 CT ABD & PELVIS W/CONTRAST: CPT

## 2020-10-16 PROCEDURE — 80053 COMPREHEN METABOLIC PANEL: CPT

## 2020-10-16 PROCEDURE — 93005 ELECTROCARDIOGRAM TRACING: CPT

## 2020-10-16 PROCEDURE — 81003 URINALYSIS AUTO W/O SCOPE: CPT

## 2020-10-16 PROCEDURE — 85027 COMPLETE CBC AUTOMATED: CPT

## 2020-10-16 PROCEDURE — 99282 EMERGENCY DEPT VISIT SF MDM: CPT

## 2020-10-16 PROCEDURE — 83690 ASSAY OF LIPASE: CPT

## 2020-10-16 RX ORDER — ONDANSETRON 4 MG/1
4 TABLET, ORALLY DISINTEGRATING ORAL EVERY 8 HOURS PRN
Qty: 10 TABLET | Refills: 0 | Status: SHIPPED | OUTPATIENT
Start: 2020-10-16

## 2020-10-16 RX ORDER — ONDANSETRON 2 MG/ML
4 INJECTION INTRAMUSCULAR; INTRAVENOUS ONCE
Status: COMPLETED | OUTPATIENT
Start: 2020-10-16 | End: 2020-10-16

## 2020-10-16 RX ADMIN — IOPAMIDOL 90 ML: 755 INJECTION, SOLUTION INTRAVENOUS at 13:50

## 2020-10-16 RX ADMIN — ONDANSETRON 4 MG: 2 INJECTION INTRAMUSCULAR; INTRAVENOUS at 13:06

## 2020-10-16 ASSESSMENT — ENCOUNTER SYMPTOMS
SHORTNESS OF BREATH: 0
DIARRHEA: 0
NAUSEA: 1
VOMITING: 0
ABDOMINAL PAIN: 1
EYE PAIN: 0

## 2020-10-16 ASSESSMENT — PAIN SCALES - GENERAL: PAINLEVEL_OUTOF10: 5

## 2020-10-16 NOTE — ED TRIAGE NOTES
Pt to ED with c/o abdominal pain x2 weeks pt reports pain radiates from right side to center of abdomen Pt reports difficulties with BM unless taking Miralax

## 2020-10-16 NOTE — ED PROVIDER NOTES
[facility]  eMERGENCY dEPARTMENT eNCOUnter      Pt Name: Anurag Fernandez  MRN: [id number]  Acgflen 1965  Date of evaluation: 10/16/2020  Provider: Jean-Pierre Woodward MD    [address] [place] [address]       Chief Complaint   Patient presents with    Abdominal Pain     RLQ pain x 2 weeks         HISTORY OF PRESENT ILLNESS   (Location/Symptom, Timing/Onset,Context/Setting, Quality, Duration, Modifying Factors, Severity)  Note limiting factors. Anurag Fernandez is a 47 y.o. female who presents to the emergency department with abdominal pain. Patient said that for the past 2 weeks has had pain in the right lower quadrant and fairly constant. Worse the past day or 2. Nausea. No vomiting diarrhea. No urinary symptoms. Radiates to right flank some. No pain in her legs. No diarrhea. No fevers. No rashes. Has also felt constipated for the past 2 weeks. Very small bowel movements and some loose stools after taking MiraLAX. HPI    NursingNotes were reviewed. REVIEW OF SYSTEMS    (2-9 systems for level 4, 10 or more for level 5)     Review of Systems   Constitutional: Negative for fever. Eyes: Negative for pain. Respiratory: Negative for shortness of breath. Cardiovascular: Negative for chest pain and palpitations. Gastrointestinal: Positive for abdominal pain and nausea. Negative for diarrhea and vomiting. Genitourinary: Negative for dysuria. Skin: Negative for rash. Neurological: Negative for weakness and headaches. All other systems reviewed and are negative. A complete review of systems was performed and is negative except as noted above in the HPI.        PAST MEDICAL HISTORY     Past Medical History:   Diagnosis Date    Allergic rhinitis     Bradycardia 2/14/2012    Chest pain 2/14/2012    Depression     DM (diabetes mellitus) ([facility] [address]     normal for 3 years    Fibromyalgia     Hyperlipidemia     Hypertension     No natural teeth     Palpitations 2/14/2012    Schizophrenia Smoker     Packs/day: 0.50     Years: 17.00     Pack years: 8.50    Smokeless tobacco: Never Used   Substance and Sexual Activity    Alcohol use: No    Drug use: No    Sexual activity: Not Currently   Lifestyle    Physical activity     Days per week: None     Minutes per session: None    Stress: None   Relationships    Social connections     Talks on phone: None     Gets together: None     Attends Rastafarian service: None     Active member of club or organization: None     Attends meetings of clubs or organizations: None     Relationship status: None    Intimate partner violence     Fear of current or ex partner: None     Emotionally abused: None     Physically abused: None     Forced sexual activity: None   Other Topics Concern    None   Social History Narrative    None       SCREENINGS             PHYSICAL EXAM    (up to 7 for level 4, 8 or more for level 5)     ED Triage Vitals [10/16/20 1124]   BP Temp Temp Source Pulse Resp SpO2 Height Weight   (!) 156/76 98.7 °F (37.1 °C) Temporal 68 20 98 % -- 167 lb (75.8 kg)       Physical Exam  Vitals signs reviewed. Constitutional:       General: She is not in acute distress. Appearance: She is well-developed. HENT:      Head: Normocephalic and atraumatic. Mouth/Throat:      Mouth: Mucous membranes are moist.      Pharynx: Oropharynx is clear. Eyes:      General: No scleral icterus. Pupils: Pupils are equal, round, and reactive to light. Neck:      Musculoskeletal: Normal range of motion and neck supple. Vascular: No JVD. Cardiovascular:      Rate and Rhythm: Normal rate and regular rhythm. Pulses: Normal pulses. Heart sounds: Normal heart sounds. No murmur. Pulmonary:      Effort: Pulmonary effort is normal. No respiratory distress. Breath sounds: Normal breath sounds. Abdominal:      General: There is no distension. Palpations: Abdomen is soft. There is no pulsatile mass. Tenderness:  There is abdominal tenderness in the right lower quadrant. There is no guarding or rebound. Musculoskeletal:         General: No tenderness. Right lower leg: No edema. Left lower leg: No edema. Skin:     General: Skin is warm and dry. Capillary Refill: Capillary refill takes less than 2 seconds. Neurological:      General: No focal deficit present. Mental Status: She is alert and oriented to person, place, and time. Psychiatric:         Mood and Affect: Mood normal.         Behavior: Behavior normal.         DIAGNOSTIC RESULTS     EKG: All EKG's are interpreted by the Emergency Department Physician who either signs or Co-signs this chart in the absence of a cardiologist.    Normal sinus rhythm. Normal QT. Borderline ST changes which look like normal early repolarization pattern. Borderline T wave changes anteriorly. No prior EKG for comparison. RADIOLOGY:   Non-plain film images such as CT, Ultrasound and MRI are read by the radiologist. Dina Miles images are visualized and preliminarily interpreted by the emergency physician with the below findings:    Interpretation per the Radiologist below, if available at the time of this note:    CT ABDOMEN PELVIS W IV CONTRAST Additional Contrast? None   Final Result   Impression:   1. Normal appendix. 2. Mild wall thickening of small bowel loops in the left mid abdomen   may be artifactual but can be seen with enteritis. 3. Hepatic steatosis. 4. Mild atherosclerotic disease. Signed by Dr Nabil Danielson on 10/16/2020 2:36 PM            LABS:  Labs Reviewed   CBC - Abnormal; Notable for the following components:       Result Value    MCHC 31.9 (*)     All other components within normal limits   COMPREHENSIVE METABOLIC PANEL W/ REFLEX TO MG FOR LOW K   URINE RT REFLEX TO CULTURE   LIPASE   TROPONIN       All other labs were within normal range or not returned as of this dictation.     EMERGENCY DEPARTMENT COURSE and DIFFERENTIALDIAGNOSIS/MDM: Vitals:    Vitals:    10/16/20 1124   BP: (!) 156/76   Pulse: 68   Resp: 20   Temp: 98.7 °F (37.1 °C)   TempSrc: Temporal   SpO2: 98%   Weight: 167 lb (75.8 kg)       MDM  Patient is nontoxic on exam no distress. Resting comfortably at this time. Nausea better after Zofran. Abdomen soft. Told patient uncertain as far as etiology of her symptoms. Discussed all results with her including incidental findings on imaging. Patient stable for discharge. Do not indication for admission. Recommend patient follow-up with her doctor to discuss results of today for further evaluation and return to the ER for change worsening symptoms or new concerns. Patient agreeable plan. CONSULTS:  None    PROCEDURES:  Unless otherwise notedbelow, none     Procedures    FINAL IMPRESSION     1.  Right lower quadrant abdominal pain          DISPOSITION/PLAN   DISPOSITION Decision To Discharge 10/16/2020 03:35:35 PM      PATIENT REFERRED TO:  @FUP@    DISCHARGE MEDICATIONS:  New Prescriptions    ONDANSETRON (ZOFRAN ODT) 4 MG DISINTEGRATING TABLET    Take 1 tablet by mouth every 8 hours as needed for Nausea or Vomiting          (Please note that portions of this note were completed with a voice recognition program.  Efforts were made to edit the dictations butoccasionally words are mis-transcribed.)    Ashley Matthews MD (electronically signed)  AttendingEmergency Physician         Ashley Matthews MD  10/16/20 [id number]

## 2020-10-20 LAB
EKG P AXIS: 58 DEGREES
EKG P-R INTERVAL: 124 MS
EKG Q-T INTERVAL: 426 MS
EKG QRS DURATION: 90 MS
EKG QTC CALCULATION (BAZETT): 407 MS
EKG T AXIS: 34 DEGREES

## 2020-11-02 ENCOUNTER — TELEPHONE (OUTPATIENT)
Dept: NEUROLOGY | Age: 55
End: 2020-11-02

## 2020-11-02 NOTE — TELEPHONE ENCOUNTER
Called patient about an appointment, with Dillon Hawley, could not reach patient, call restrictions, mailed patient appointment in mail at current address.

## 2021-03-31 NOTE — TELEPHONE ENCOUNTER
Called patient to confirm appointment for - with Oskar Alvarezr. No answer. Left a VM regarding the appointment time/date. [phone]

## 2023-03-08 ENCOUNTER — TELEPHONE (OUTPATIENT)
Dept: PODIATRY | Facility: CLINIC | Age: 58
End: 2023-03-08
Payer: COMMERCIAL

## 2023-03-09 ENCOUNTER — TELEPHONE (OUTPATIENT)
Dept: PODIATRY | Facility: CLINIC | Age: 58
End: 2023-03-09
Payer: COMMERCIAL

## 2023-08-10 ENCOUNTER — TELEPHONE (OUTPATIENT)
Dept: PODIATRY | Facility: CLINIC | Age: 58
End: 2023-08-10
Payer: COMMERCIAL

## 2023-08-10 ENCOUNTER — TELEPHONE (OUTPATIENT)
Dept: VASCULAR SURGERY | Facility: CLINIC | Age: 58
End: 2023-08-10
Payer: COMMERCIAL